# Patient Record
Sex: FEMALE | Race: WHITE | Employment: FULL TIME | URBAN - METROPOLITAN AREA
[De-identification: names, ages, dates, MRNs, and addresses within clinical notes are randomized per-mention and may not be internally consistent; named-entity substitution may affect disease eponyms.]

---

## 2018-08-31 ENCOUNTER — HOSPITAL ENCOUNTER (OUTPATIENT)
Age: 38
Discharge: HOME | End: 2018-08-31

## 2019-09-04 ENCOUNTER — OFFICE VISIT (OUTPATIENT)
Dept: FAMILY MEDICINE CLINIC | Age: 39
End: 2019-09-04
Payer: COMMERCIAL

## 2019-09-04 VITALS
HEIGHT: 68 IN | DIASTOLIC BLOOD PRESSURE: 88 MMHG | HEART RATE: 68 BPM | RESPIRATION RATE: 10 BRPM | TEMPERATURE: 97.8 F | SYSTOLIC BLOOD PRESSURE: 122 MMHG | WEIGHT: 232.4 LBS | OXYGEN SATURATION: 98 % | BODY MASS INDEX: 35.22 KG/M2

## 2019-09-04 DIAGNOSIS — E28.2 PCOS (POLYCYSTIC OVARIAN SYNDROME): ICD-10-CM

## 2019-09-04 DIAGNOSIS — R35.1 NOCTURIA: ICD-10-CM

## 2019-09-04 DIAGNOSIS — R53.83 TIRED: Primary | ICD-10-CM

## 2019-09-04 DIAGNOSIS — R52 ACHES: ICD-10-CM

## 2019-09-04 DIAGNOSIS — G43.809 OTHER MIGRAINE WITHOUT STATUS MIGRAINOSUS, NOT INTRACTABLE: ICD-10-CM

## 2019-09-04 DIAGNOSIS — R63.5 WEIGHT GAIN: ICD-10-CM

## 2019-09-04 DIAGNOSIS — F41.9 ANXIETY: ICD-10-CM

## 2019-09-04 DIAGNOSIS — R45.89 MOODY: ICD-10-CM

## 2019-09-04 PROCEDURE — 99202 OFFICE O/P NEW SF 15 MIN: CPT | Performed by: FAMILY MEDICINE

## 2019-09-04 RX ORDER — NORGESTIMATE AND ETHINYL ESTRADIOL 7DAYSX3 28
KIT ORAL DAILY
Refills: 0 | COMMUNITY
Start: 2019-07-09 | End: 2022-09-19 | Stop reason: ALTCHOICE

## 2019-09-04 SDOH — HEALTH STABILITY: MENTAL HEALTH: HOW OFTEN DO YOU HAVE A DRINK CONTAINING ALCOHOL?: 2-4 TIMES A MONTH

## 2019-09-04 SDOH — HEALTH STABILITY: MENTAL HEALTH: HOW MANY STANDARD DRINKS CONTAINING ALCOHOL DO YOU HAVE ON A TYPICAL DAY?: 3 OR 4

## 2019-09-04 ASSESSMENT — PATIENT HEALTH QUESTIONNAIRE - PHQ9
2. FEELING DOWN, DEPRESSED OR HOPELESS: 0
SUM OF ALL RESPONSES TO PHQ9 QUESTIONS 1 & 2: 0
SUM OF ALL RESPONSES TO PHQ QUESTIONS 1-9: 0
SUM OF ALL RESPONSES TO PHQ QUESTIONS 1-9: 0
1. LITTLE INTEREST OR PLEASURE IN DOING THINGS: 0

## 2019-09-04 ASSESSMENT — ENCOUNTER SYMPTOMS
RHINORRHEA: 1
COUGH: 1
ABDOMINAL DISTENTION: 1

## 2019-09-04 NOTE — PROGRESS NOTES
has a copy of their most recent labs to take home with them as notedbelow;       Imaging Data:   Imaging Data:       Assessment & Plan:       Impression:  1. Tired    2. PCOS (polycystic ovarian syndrome)    3. Weight gain    4. Aches    5. Anxiety    6. Choi    7. Nocturia    8. Other migraine without status migrainosus, not intractable      Assessment and Plan:  After reviewing the patients chief complaints, reviewing their labfindings in great detail (with the patient and those accompanying them) which correlate to their chief complaints, symptoms, and or medical conditions; suggestions were made relating to changes in diet and or supplementswhich may improve the complaints and which will be reflected in their future lab findings; Chief Complaint   Patient presents with   Miguelito manuel protocol- last place for lab in [address] about april 2017    Other     improve health, reproductive issues   ;    Plans for the next visits:  - Abnormal and non-optimal Labs were ordered today to be repeated in the next 120-365 days to assess changes from adjustments in nutrition and or nutrients. - Patient instructed when having ablood draw to ask the  to divide their lab draws into multiple draws over several days if not feeling good at the time of the lab draw or if either prefers to do several smaller blood draws over several days  -Patient instructed to check with insurer before each lab draw and to to to the lab which the insurer directs them for the most cost effective lab draw with the least patient's cost  - Lynn Kemp  will be scheduled subsequentto those results. Sienna Osorio will bring in her drink and food log to her next visit    Chronic Problems Addressed on this Visit:                                   1.  Intensity of Service; Uncontrolled items at this visit;   Chief Complaint   Patient presents with    Established New Doctor     wee protocol- last place for lab in [address] about april if having > 2bowel movement/day  - Centrum Silver or look-a-like for most patients, Centrum plain or look-a-like with iron  - Vitamin D-3 comes as 1,000 IU or 2,000 IU or 5,000 IU gel caps or Liquid drops      Some brands containing or derived from soy oil or corn oil are OK if not allergic to soy  - Elemental Iron 65 mg tabsat bedtime is available over the counter if need more iron     Usually turns bowel movements grey, green or black but not a concern  - Apricot Kernel Oil (by Now) for dry skin sensitive perineal or perianal area skin    Nutrients for ongoing use by Mail order for less expense from www. [employer].com ;  - Triple Strength Fish Oil , 240 Softgels Item [id number]  -B-100 time released balanced B complex Item #196899  - Cinnamon bark 500 mg without Chromium or extract Item #843097  - Calcium carbonate 1000 mg, Magnesium oxide 500 mg, Vit D 3  400 IU Item #439777  - Magnesium oxide 500 mg tabs Item #665798 if less than 2 bowel movements daily  - [employer] Item #819765 for mostpatients, One Daily Item #730258 with iron  - Vit D 3  1,000 Item #357984      2,000 IU Item #558115  5,000 IU Item #589169     Some brands containing orderived from soy oil or corn oil are OK if not allergic to soy    Nutrients for Special Needs by Helen Suggs for less expense from www. [employer].com ;  -Elemental Iron 65 mg tabs Item #603411 if need more iron for low iron on labs    Usually turns bowel movements grey, green or black but not a concern  - Time released Niacin 250 mg Item #962072 for cold intolerance, low libido or impotence  - DHEA 50 mg Item #356548 for improving DHEA levels on labs if having Fatigue    If stools too loose substitute for your Magnesium oxide using;   Magnesium citrate 200 mg tabs(NOT liquid) at [employer]   Magnesium gluconate 550 mgby Eduardo at [employer]. com or amazon. com  Magnesium chloride foot soaks or body sprays  www.[employer]   Magnesium chloride flakes 14.99 Item #: [id number] if

## 2019-10-16 ENCOUNTER — NURSE ONLY (OUTPATIENT)
Dept: LAB | Age: 39
End: 2019-10-16

## 2020-06-11 ENCOUNTER — HOSPITAL ENCOUNTER (OUTPATIENT)
Dept: GENERAL RADIOLOGY | Age: 40
Discharge: HOME | End: 2020-06-11
Payer: COMMERCIAL

## 2020-06-11 ENCOUNTER — HOSPITAL ENCOUNTER (OUTPATIENT)
Age: 40
Discharge: HOME | End: 2020-06-11
Payer: COMMERCIAL

## 2020-06-11 PROCEDURE — 72100 X-RAY EXAM L-S SPINE 2/3 VWS: CPT

## 2020-06-11 PROCEDURE — 77073 BONE LENGTH STUDIES: CPT

## 2020-07-03 ENCOUNTER — HOSPITAL ENCOUNTER (OUTPATIENT)
Dept: MRI IMAGING | Age: 40
Discharge: HOME | End: 2020-07-03
Payer: COMMERCIAL

## 2020-07-03 PROCEDURE — 72148 MRI LUMBAR SPINE W/O DYE: CPT

## 2021-08-27 ENCOUNTER — HOSPITAL ENCOUNTER (OUTPATIENT)
Dept: WOMENS IMAGING | Age: 41
Discharge: HOME | End: 2021-08-27
Payer: COMMERCIAL

## 2021-08-27 DIAGNOSIS — N64.52 BLOODY DISCHARGE FROM LEFT NIPPLE: ICD-10-CM

## 2021-08-27 DIAGNOSIS — N64.52 DISCHARGE FROM NIPPLE: ICD-10-CM

## 2021-08-27 PROCEDURE — G0279 TOMOSYNTHESIS, MAMMO: HCPCS

## 2021-08-27 PROCEDURE — 76642 ULTRASOUND BREAST LIMITED: CPT

## 2022-09-09 ENCOUNTER — HOSPITAL ENCOUNTER (OUTPATIENT)
Dept: MRI IMAGING | Age: 42
Discharge: HOME | End: 2022-09-09
Payer: COMMERCIAL

## 2022-09-09 DIAGNOSIS — Z80.3 FAMILY HISTORY OF MALIGNANT NEOPLASM OF BREAST: ICD-10-CM

## 2022-09-09 DIAGNOSIS — N64.52 NIPPLE DISCHARGE: ICD-10-CM

## 2022-09-09 PROCEDURE — 6360000004 HC RX CONTRAST MEDICATION: Performed by: NURSE PRACTITIONER

## 2022-09-09 PROCEDURE — C8908 MRI W/O FOL W/CONT, BREAST,: HCPCS

## 2022-09-09 PROCEDURE — A9579 GAD-BASE MR CONTRAST NOS,1ML: HCPCS | Performed by: NURSE PRACTITIONER

## 2022-09-09 RX ADMIN — GADOTERIDOL 20 ML: 279.3 INJECTION, SOLUTION INTRAVENOUS at 08:14

## 2022-09-16 ENCOUNTER — TELEPHONE (OUTPATIENT)
Dept: SURGERY | Age: 42
End: 2022-09-16

## 2022-09-16 ENCOUNTER — HOSPITAL ENCOUNTER (OUTPATIENT)
Dept: WOMENS IMAGING | Age: 42
Discharge: HOME | End: 2022-09-16
Payer: COMMERCIAL

## 2022-09-16 DIAGNOSIS — R92.8 ABNORMAL MRI, BREAST: ICD-10-CM

## 2022-09-16 DIAGNOSIS — N64.52 NIPPLE DISCHARGE: ICD-10-CM

## 2022-09-16 DIAGNOSIS — N64.52 BLOODY DISCHARGE FROM LEFT NIPPLE: ICD-10-CM

## 2022-09-16 PROCEDURE — G0279 TOMOSYNTHESIS, MAMMO: HCPCS

## 2022-09-16 PROCEDURE — 76642 ULTRASOUND BREAST LIMITED: CPT

## 2022-09-16 NOTE — TELEPHONE ENCOUNTER
Left a message for patient. Patient needs scheduled for an appointment with Dr. Gerardo Ugarte for consultation of left bloody nipple discharge per [facility].

## 2022-09-19 ENCOUNTER — OFFICE VISIT (OUTPATIENT)
Dept: SURGERY | Age: 42
End: 2022-09-19
Payer: COMMERCIAL

## 2022-09-19 VITALS
HEART RATE: 62 BPM | DIASTOLIC BLOOD PRESSURE: 62 MMHG | WEIGHT: 241.7 LBS | SYSTOLIC BLOOD PRESSURE: 120 MMHG | BODY MASS INDEX: 35.8 KG/M2 | HEIGHT: 69 IN | TEMPERATURE: 97.6 F | RESPIRATION RATE: 18 BRPM | OXYGEN SATURATION: 98 %

## 2022-09-19 DIAGNOSIS — N64.52 BLOODY DISCHARGE FROM LEFT NIPPLE: Primary | ICD-10-CM

## 2022-09-19 PROCEDURE — 99203 OFFICE O/P NEW LOW 30 MIN: CPT | Performed by: SURGERY

## 2022-09-19 RX ORDER — DROSPIRENONE 4 MG/1
TABLET, FILM COATED ORAL
COMMUNITY
Start: 2021-12-01

## 2022-09-19 NOTE — PROGRESS NOTES
Naida Mchugh MD   General Surgery  New Patient Evaluation in Office  Pt Name: Sangeetha Moreno  Date of Birth 1980   Today's Date: 9/19/2022  Medical Record Number: [id number]  Referring Provider: Dr. Jacque Silva  Primary Care Provider: Kimberli Grant MD  Chief Complaint:  Chief Complaint   Patient presents with    Surgical Consult     New patient-referred by [facility] breast bloody nipple discharge       ASSESSMENT      1. Bloody discharge from left nipple    2. MRI detected lesion left nipple  3. Premenopausal     PLANS      Patient needs tissue diagnosis with intermittent bloody nipple discharge and lesion seen on MRI at the nipple. Clinical exam is benign. No active bleeding today. Schedule patient for left breast biopsy with subareolar mass excision  MAC anesthesia  Preoperative testing per anesthesia guidelines        SUBJECTIVE     Clarice Eisenberg is a 38y.o. year old female who is presenting today in the office for evaluation of intermittent bloody nipple discharge which she has had for about 1 year. She states she will occasionally see some blood staining on her bra. She has no skin lesions. She had recent bilateral mammography and left breast ultrasound as well as an MRI. He had breast imaging last year as well. Mammography revealed a heterogeneous hypoechoic area demonstrated at the left nipple likely involving the dermal layer with indistinct margins likely correlating to area on MRI. Subjacent to the lesion there is a possible intraductal component. Tissue diagnosis recommended. On MRI there is a focus of an enhancement apical left nipple. Clarice Eisenberg has had no prior breast biopsies. She took oral contraceptive medications for about 1 year. She now takes an oral contraceptive medication which is progesterone only. She has a history of polycystic ovary syndrome and her menstrual periods are irregular at times. Menarche age 17. G0, P0. She has 3 stepdaughters.   Paternal grandmother history of Negative for pain, redness and visual disturbance. Respiratory:  Negative for cough, shortness of breath and wheezing. Cardiovascular:  Negative for chest pain and palpitations. Gastrointestinal:  Negative for abdominal pain, blood in stool, nausea and vomiting. Endocrine: Negative for cold intolerance, heat intolerance and polydipsia. Genitourinary:  Negative for dysuria, flank pain and hematuria. Musculoskeletal:  Positive for back pain. Negative for gait problem, joint swelling and myalgias. Skin:  Negative for color change and rash. Allergic/Immunologic: Negative for immunocompromised state. Neurological:  Negative for dizziness, tremors, seizures, speech difficulty, light-headedness and headaches. Hematological:  Negative for adenopathy. Does not bruise/bleed easily. Psychiatric/Behavioral:  Negative for agitation, behavioral problems and confusion. OBJECTIVE     /62 (Site: Left Upper Arm, Position: Sitting, Cuff Size: Medium Adult)   Pulse 62   Temp 97.6 °F (36.4 °C) (Temporal)   Resp 18   Ht 5' 9\" (1.753 m)   Wt 241 lb 11.2 oz (109.6 kg)   SpO2 98%   BMI 35.69 kg/m²      Physical Exam  Vitals reviewed. Constitutional:       Appearance: Normal appearance. She is well-developed. She is not ill-appearing. HENT:      Head: Normocephalic and atraumatic. Nose: Nose normal. No congestion. Eyes:      General: No scleral icterus. Extraocular Movements: Extraocular movements intact. Neck:      Vascular: No JVD. Trachea: No tracheal deviation. Cardiovascular:      Rate and Rhythm: Normal rate and regular rhythm. Heart sounds: Normal heart sounds. No murmur heard. Pulmonary:      Effort: Pulmonary effort is normal. No respiratory distress. Breath sounds: Normal breath sounds. No wheezing. Chest:      Chest wall: No mass, swelling or tenderness.    Breasts:     Right: No swelling, bleeding, inverted nipple, mass, nipple discharge, skin change, tenderness, axillary adenopathy or supraclavicular adenopathy. Left: No swelling, bleeding, inverted nipple, mass, nipple discharge, skin change, tenderness, axillary adenopathy or supraclavicular adenopathy. Comments: No obvious nipple abnormality or mass to palpation or inspection on the left  Abdominal:      General: There is no distension. Palpations: There is no mass. Tenderness: There is no abdominal tenderness. Musculoskeletal:         General: No deformity. Cervical back: No tenderness. Right lower leg: No edema. Left lower leg: No edema. Lymphadenopathy:      Cervical: No cervical adenopathy. Right cervical: No superficial, deep or posterior cervical adenopathy. Left cervical: No superficial, deep or posterior cervical adenopathy. Upper Body:      Right upper body: No supraclavicular, axillary or pectoral adenopathy. Left upper body: No supraclavicular, axillary or pectoral adenopathy. Skin:     General: Skin is warm and dry. Coloration: Skin is not jaundiced or pale. Findings: No rash. Neurological:      General: No focal deficit present. Mental Status: She is alert and oriented to person, place, and time. Cranial Nerves: No cranial nerve deficit. Psychiatric:         Mood and Affect: Mood normal.         Behavior: Behavior normal.       No results found for: WBC, HGB, HCT, PLT, CHOL, TRIG, HDL, LDLDIRECT, ALT, AST, NA, K, CL, CREATININE, BUN, CO2, TSH, PSA, INR, GLUF, LABA1C, LABMICR           LOCATION: [place]       PROCEDURE: [facility] LIBBY DIGITAL DIAGNOSTIC BILATERAL, US BREAST LIMITED LEFT       CLINICAL INFORMATION: Nipple discharge . Tomosynthesis. PATIENT MEDICAL HISTORY: No relevant medical history has been documented for this patient. FAMILY HISTORY: Family medical history includes breast cancer in paternal grandmother.        RISK VALUES: Tyrer-Cuzick 10yr.: 2.7%, Tyrer-Cuzick life: 22.4%       COMPARISON: 9/9/2022, 8/27/2021       [facility] LIBBY DIGITAL DIAGNOSTIC BILATERAL:       TECHNIQUE: Bilateral CC and MLO views were obtained each breast. Tomosynthesis was used. CAD was used. BREAST COMPOSITION: The tissue of the breast(s) is heterogeneously dense. This may lower the sensitivity of mammography. FINDINGS:        There is no mammographic correlate for for the patient's left bloody nipple discharge. Targeted left breast ultrasound is reported below. No significant masses, calcifications, or other findings are seen in the breast(s). There has been no significant interval change. US BREAST LIMITED LEFT:       TECHNIQUE: Targeted ultrasound of the left breast was performed. Grayscale images and color images of the real-time examination were reviewed. The axilla was also imaged. FINDINGS:        There is a heterogeneous hypoechoic area demonstrated at the left nipple which involves the dermal layer. This demonstrates indistinct margins. This likely correlates with area of increased enhancement seen on prior breast MRI dated 9/9/2022. This    measures approximately 1.6 x 1.7 x 1 cm in dimension. Of note, subjacent to this lesion is a possible intraductal component emanating from this hypoechoic area. Given the superficial and suspected dermal involvement, recommend surgical consultation for    possible punch or excisional biopsy. There are no sonographic abnormalities in the axilla. Normal lymph nodes are seen. Impression   1. There is a heterogeneous hypoechoic area demonstrated at the left nipple which involves the dermal layer. This demonstrates indistinct margins. This likely correlates with area of increased enhancement seen on prior breast MRI dated 9/9/2022. This    measures approximately 1.6 x 1.7 x 1 cm in dimension. Of note, subjacent to this lesion is a possible intraductal component emanating from this hypoechoic area.  Given the superficial and suspected dermal involvement, recommend surgical consultation for    possible punch or excisional biopsy. BI-RADS CATEGORY 4B - Suspicious abnormality - Intermediate suspicion for malignancy. Management: Tissue diagnosis. Biopsy should be performed in the absence of clinical contraindication. Given the superficial and suspected dermal involvement, recommend surgical consultation for possible punch or excisional biopsy. **This report has been created using voice recognition software. It may contain minor errors which are inherent in voice recognition technology. **       Final report electronically signed by Dr. Fran Jeffers on 9/18/2022 11:23 PM         ADDENDUM #1 *       Armaan Wise calculations report this patient's lifetime risk for    developing breast cancer at 22.4 %. Based on this assessment tool, the    patient's calculated lifetime risk is at or above 20%. Per the American    Cancer Society guidelines, she may be a    candidate for screening breast MRI. Final report electronically signed by Dr. Fran Jeffers on 9/19/2022 12:19    AM       *ORIGINAL REPORT *   EXAM: MRI BREAST BILATERAL W WO CONTRAST        INDICATION: Bloody nipple discharge, left side. HISTORY: 46 years, Female, Family history of malignant neoplasm of breast,    Nipple discharge       COMPARISON: 8/27/2021       TECHNIQUE: Axial and coronal T2 STIR, sagittal T1, axial T1 fat saturated    pre and postcontrast dynamic imaging was performed of both breasts. Intravenous ProHance gadolinium was given. Images were reviewed on a    separate dedicated 3-D workstation and time    intensity curves were analyzed. FINDINGS:       Amount of Fibroglandular Tissue: The breast parenchyma is heterogeneously    dense. Background Parenchymal Enhancement:  Mild. Right breast: There are no areas of suspicious enhancement. There is no    suspicious morphology. No dominant lesions are present.        There is a 5 mm focus of enhancement within the right breast on axial    image 59 series 9. This demonstrates washout kinetics enhancement    characteristics. This correlates with a benign-appearing intramammary    lymph node within the outer right breast seen on    prior mammogram dated 8/27/2021. Left breast:        There is a focus of enhancement demonstrated apical left nipple seen on    axial image 39 series 9. This measures 9 x 4 mm. This demonstrates    heterogeneous enhancement kinetics including washout kinetics. Recommend    second look ultrasound of the left    periareolar/retroareolar region. There are no other areas of suspicious enhancement. There is no other    suspicious morphology. There are no abnormalities in the axilla, mediastinum or visualized bones. MRI BREAST BILATERAL W WO CONTRAST (Order [id number])  Narrative   EXAM: MRI BREAST BILATERAL W WO CONTRAST        INDICATION: Bloody nipple discharge, left side. HISTORY: 46 years, Female, Family history of malignant neoplasm of breast,    Nipple discharge       COMPARISON: 8/27/2021       TECHNIQUE: Axial and coronal T2 STIR, sagittal T1, axial T1 fat saturated    pre and postcontrast dynamic imaging was performed of both breasts. Intravenous ProHance gadolinium was given. Images were reviewed on a    separate dedicated 3-D workstation and time    intensity curves were analyzed. FINDINGS:       Amount of Fibroglandular Tissue: The breast parenchyma is heterogeneously    dense. Background Parenchymal Enhancement:  Mild. Right breast: There are no areas of suspicious enhancement. There is no    suspicious morphology. No dominant lesions are present. There is a 5 mm focus of enhancement within the right breast on axial    image 59 series 9. This demonstrates washout kinetics enhancement    characteristics.  This correlates with a benign-appearing intramammary    lymph node within the outer right breast seen on

## 2022-09-20 ASSESSMENT — ENCOUNTER SYMPTOMS
SORE THROAT: 0
ABDOMINAL PAIN: 0
EYE PAIN: 0
COLOR CHANGE: 0
SHORTNESS OF BREATH: 0
WHEEZING: 0
TROUBLE SWALLOWING: 0
NAUSEA: 0
BLOOD IN STOOL: 0
EYE REDNESS: 0
VOMITING: 0
COUGH: 0
BACK PAIN: 1

## 2022-09-23 ENCOUNTER — NURSE ONLY (OUTPATIENT)
Dept: LAB | Age: 42
End: 2022-09-23

## 2022-09-23 DIAGNOSIS — N64.52 BLOODY DISCHARGE FROM LEFT NIPPLE: ICD-10-CM

## 2022-09-23 LAB
HCT VFR BLD CALC: 43.1 % (ref 37–47)
HEMOGLOBIN: 14 GM/DL (ref 12–16)

## 2022-09-26 ENCOUNTER — ANESTHESIA (OUTPATIENT)
Dept: OPERATING ROOM | Age: 42
End: 2022-09-26
Payer: COMMERCIAL

## 2022-09-26 ENCOUNTER — HOSPITAL ENCOUNTER (OUTPATIENT)
Age: 42
Setting detail: OUTPATIENT SURGERY
Discharge: HOME | End: 2022-09-26
Attending: SURGERY | Admitting: SURGERY
Payer: COMMERCIAL

## 2022-09-26 ENCOUNTER — ANESTHESIA EVENT (OUTPATIENT)
Dept: OPERATING ROOM | Age: 42
End: 2022-09-26
Payer: COMMERCIAL

## 2022-09-26 VITALS
OXYGEN SATURATION: 97 % | WEIGHT: 241.6 LBS | RESPIRATION RATE: 18 BRPM | TEMPERATURE: 97.1 F | HEART RATE: 53 BPM | BODY MASS INDEX: 35.78 KG/M2 | DIASTOLIC BLOOD PRESSURE: 56 MMHG | SYSTOLIC BLOOD PRESSURE: 105 MMHG | HEIGHT: 69 IN

## 2022-09-26 DIAGNOSIS — N64.52 NIPPLE DISCHARGE, BLOODY: ICD-10-CM

## 2022-09-26 LAB — PREGNANCY, URINE: NEGATIVE

## 2022-09-26 PROCEDURE — 3600000013 HC SURGERY LEVEL 3 ADDTL 15MIN: Performed by: SURGERY

## 2022-09-26 PROCEDURE — 6360000002 HC RX W HCPCS

## 2022-09-26 PROCEDURE — 7100000011 HC PHASE II RECOVERY - ADDTL 15 MIN: Performed by: SURGERY

## 2022-09-26 PROCEDURE — 7100000010 HC PHASE II RECOVERY - FIRST 15 MIN: Performed by: SURGERY

## 2022-09-26 PROCEDURE — 2709999900 HC NON-CHARGEABLE SUPPLY: Performed by: SURGERY

## 2022-09-26 PROCEDURE — 3700000001 HC ADD 15 MINUTES (ANESTHESIA): Performed by: SURGERY

## 2022-09-26 PROCEDURE — 88307 TISSUE EXAM BY PATHOLOGIST: CPT

## 2022-09-26 PROCEDURE — 19120 REMOVAL OF BREAST LESION: CPT | Performed by: SURGERY

## 2022-09-26 PROCEDURE — 3600000003 HC SURGERY LEVEL 3 BASE: Performed by: SURGERY

## 2022-09-26 PROCEDURE — 2500000003 HC RX 250 WO HCPCS: Performed by: SURGERY

## 2022-09-26 PROCEDURE — 3700000000 HC ANESTHESIA ATTENDED CARE: Performed by: SURGERY

## 2022-09-26 PROCEDURE — 2580000003 HC RX 258: Performed by: SURGERY

## 2022-09-26 PROCEDURE — 2500000003 HC RX 250 WO HCPCS

## 2022-09-26 PROCEDURE — 6360000002 HC RX W HCPCS: Performed by: SURGERY

## 2022-09-26 PROCEDURE — 81025 URINE PREGNANCY TEST: CPT

## 2022-09-26 RX ORDER — MORPHINE SULFATE 2 MG/ML
2 INJECTION, SOLUTION INTRAMUSCULAR; INTRAVENOUS
Status: DISCONTINUED | OUTPATIENT
Start: 2022-09-26 | End: 2022-09-26 | Stop reason: HOSPADM

## 2022-09-26 RX ORDER — PROPOFOL 10 MG/ML
INJECTION, EMULSION INTRAVENOUS CONTINUOUS PRN
Status: DISCONTINUED | OUTPATIENT
Start: 2022-09-26 | End: 2022-09-26 | Stop reason: SDUPTHER

## 2022-09-26 RX ORDER — ACETAMINOPHEN 325 MG/1
650 TABLET ORAL EVERY 4 HOURS PRN
Status: DISCONTINUED | OUTPATIENT
Start: 2022-09-26 | End: 2022-09-26 | Stop reason: HOSPADM

## 2022-09-26 RX ORDER — OXYCODONE HYDROCHLORIDE 5 MG/1
5 TABLET ORAL EVERY 4 HOURS PRN
Status: DISCONTINUED | OUTPATIENT
Start: 2022-09-26 | End: 2022-09-26 | Stop reason: HOSPADM

## 2022-09-26 RX ORDER — ONDANSETRON 2 MG/ML
4 INJECTION INTRAMUSCULAR; INTRAVENOUS EVERY 6 HOURS PRN
Status: DISCONTINUED | OUTPATIENT
Start: 2022-09-26 | End: 2022-09-26 | Stop reason: HOSPADM

## 2022-09-26 RX ORDER — IBUPROFEN 600 MG/1
600 TABLET ORAL EVERY 8 HOURS
Status: DISCONTINUED | OUTPATIENT
Start: 2022-09-26 | End: 2022-09-26 | Stop reason: HOSPADM

## 2022-09-26 RX ORDER — LIDOCAINE HYDROCHLORIDE 20 MG/ML
INJECTION, SOLUTION EPIDURAL; INFILTRATION; INTRACAUDAL; PERINEURAL PRN
Status: DISCONTINUED | OUTPATIENT
Start: 2022-09-26 | End: 2022-09-26 | Stop reason: SDUPTHER

## 2022-09-26 RX ORDER — SODIUM CHLORIDE 0.9 % (FLUSH) 0.9 %
5-40 SYRINGE (ML) INJECTION PRN
Status: DISCONTINUED | OUTPATIENT
Start: 2022-09-26 | End: 2022-09-26 | Stop reason: HOSPADM

## 2022-09-26 RX ORDER — MORPHINE SULFATE 2 MG/ML
4 INJECTION, SOLUTION INTRAMUSCULAR; INTRAVENOUS
Status: DISCONTINUED | OUTPATIENT
Start: 2022-09-26 | End: 2022-09-26 | Stop reason: HOSPADM

## 2022-09-26 RX ORDER — SODIUM CHLORIDE 0.9 % (FLUSH) 0.9 %
5-40 SYRINGE (ML) INJECTION EVERY 12 HOURS SCHEDULED
Status: DISCONTINUED | OUTPATIENT
Start: 2022-09-26 | End: 2022-09-26 | Stop reason: HOSPADM

## 2022-09-26 RX ORDER — PROPOFOL 10 MG/ML
INJECTION, EMULSION INTRAVENOUS PRN
Status: DISCONTINUED | OUTPATIENT
Start: 2022-09-26 | End: 2022-09-26 | Stop reason: SDUPTHER

## 2022-09-26 RX ORDER — OXYCODONE HYDROCHLORIDE AND ACETAMINOPHEN 5; 325 MG/1; MG/1
1 TABLET ORAL EVERY 6 HOURS PRN
Qty: 12 TABLET | Refills: 0 | Status: SHIPPED | OUTPATIENT
Start: 2022-09-26 | End: 2022-09-29

## 2022-09-26 RX ORDER — SODIUM CHLORIDE 9 MG/ML
INJECTION, SOLUTION INTRAVENOUS CONTINUOUS
Status: DISCONTINUED | OUTPATIENT
Start: 2022-09-26 | End: 2022-09-26 | Stop reason: HOSPADM

## 2022-09-26 RX ORDER — ENOXAPARIN SODIUM 100 MG/ML
30 INJECTION SUBCUTANEOUS 2 TIMES DAILY
Status: DISCONTINUED | OUTPATIENT
Start: 2022-09-26 | End: 2022-09-26 | Stop reason: HOSPADM

## 2022-09-26 RX ORDER — OXYCODONE HYDROCHLORIDE 5 MG/1
10 TABLET ORAL EVERY 4 HOURS PRN
Status: DISCONTINUED | OUTPATIENT
Start: 2022-09-26 | End: 2022-09-26 | Stop reason: HOSPADM

## 2022-09-26 RX ORDER — SODIUM CHLORIDE 9 MG/ML
INJECTION, SOLUTION INTRAVENOUS PRN
Status: DISCONTINUED | OUTPATIENT
Start: 2022-09-26 | End: 2022-09-26 | Stop reason: HOSPADM

## 2022-09-26 RX ORDER — MIDAZOLAM HYDROCHLORIDE 1 MG/ML
INJECTION INTRAMUSCULAR; INTRAVENOUS PRN
Status: DISCONTINUED | OUTPATIENT
Start: 2022-09-26 | End: 2022-09-26 | Stop reason: SDUPTHER

## 2022-09-26 RX ORDER — ONDANSETRON 4 MG/1
4 TABLET, ORALLY DISINTEGRATING ORAL EVERY 8 HOURS PRN
Status: DISCONTINUED | OUTPATIENT
Start: 2022-09-26 | End: 2022-09-26 | Stop reason: HOSPADM

## 2022-09-26 RX ORDER — FENTANYL CITRATE 50 UG/ML
INJECTION, SOLUTION INTRAMUSCULAR; INTRAVENOUS PRN
Status: DISCONTINUED | OUTPATIENT
Start: 2022-09-26 | End: 2022-09-26 | Stop reason: SDUPTHER

## 2022-09-26 RX ORDER — ONDANSETRON 2 MG/ML
INJECTION INTRAMUSCULAR; INTRAVENOUS PRN
Status: DISCONTINUED | OUTPATIENT
Start: 2022-09-26 | End: 2022-09-26 | Stop reason: SDUPTHER

## 2022-09-26 RX ADMIN — MIDAZOLAM 2 MG: 1 INJECTION INTRAMUSCULAR; INTRAVENOUS at 09:45

## 2022-09-26 RX ADMIN — PROPOFOL 150 MCG/KG/MIN: 10 INJECTION, EMULSION INTRAVENOUS at 09:50

## 2022-09-26 RX ADMIN — ONDANSETRON 4 MG: 2 INJECTION INTRAMUSCULAR; INTRAVENOUS at 10:17

## 2022-09-26 RX ADMIN — LIDOCAINE HYDROCHLORIDE 100 MG: 20 INJECTION, SOLUTION EPIDURAL; INFILTRATION; INTRACAUDAL; PERINEURAL at 09:50

## 2022-09-26 RX ADMIN — FENTANYL CITRATE 50 MCG: 50 INJECTION, SOLUTION INTRAMUSCULAR; INTRAVENOUS at 09:50

## 2022-09-26 RX ADMIN — SODIUM CHLORIDE: 9 INJECTION, SOLUTION INTRAVENOUS at 09:30

## 2022-09-26 RX ADMIN — CEFAZOLIN 2000 MG: 10 INJECTION, POWDER, FOR SOLUTION INTRAVENOUS at 09:51

## 2022-09-26 RX ADMIN — PROPOFOL 30 MG: 10 INJECTION, EMULSION INTRAVENOUS at 09:50

## 2022-09-26 RX ADMIN — FENTANYL CITRATE 50 MCG: 50 INJECTION, SOLUTION INTRAMUSCULAR; INTRAVENOUS at 10:00

## 2022-09-26 ASSESSMENT — PAIN SCALES - GENERAL
PAINLEVEL_OUTOF10: 1
PAINLEVEL_OUTOF10: 2
PAINLEVEL_OUTOF10: 2
PAINLEVEL_OUTOF10: 0

## 2022-09-26 ASSESSMENT — PAIN DESCRIPTION - ORIENTATION: ORIENTATION: LOWER

## 2022-09-26 ASSESSMENT — PAIN DESCRIPTION - LOCATION: LOCATION: BACK

## 2022-09-26 NOTE — ANESTHESIA POSTPROCEDURE EVALUATION
Department of Anesthesiology  Postprocedure Note    Patient: Sangeetha Moreno  MRN: [id number]  YOB: 1980  Date of evaluation: 9/26/2022      Procedure Summary     Date: 09/26/22 Room / Location: [address] [facility]    Anesthesia Start: 3863 Anesthesia Stop: 1026    Procedure: LEFT BREAST BX, SUBAREOLAR EXCISION (Left: Breast) Diagnosis:       Nipple discharge, bloody      (Nipple discharge, bloody [N64.52])    Surgeons: Jeffery Estrella MD Responsible Provider: Viraj Victor MD    Anesthesia Type: MAC ASA Status: 2          Anesthesia Type: No value filed.     Kendy Phase I: Kendy Score: 10    Kendy Phase II: Kendy Score: 10      Anesthesia Post Evaluation    Patient location during evaluation: PACU  Patient participation: complete - patient participated  Level of consciousness: awake and alert  Airway patency: patent  Nausea & Vomiting: no nausea  Complications: no  Cardiovascular status: blood pressure returned to baseline and hemodynamically stable  Respiratory status: acceptable and spontaneous ventilation  Hydration status: euvolemic

## 2022-09-26 NOTE — H&P
[address] [address]  History and Physical Update    Pt Name: Sushil Pereira  MRN: [id number]  YOB: 1980  Date of evaluation: 9/26/2022    [x] I have examined the patient and reviewed the H&P/Consult and there are no changes to the patient or plans. [] I have examined the patient and reviewed the H&P/Consult and have noted the following changes:        Gallo Campos MD MD  Electronically signed 9/26/2022 at 8:55 Ivette Roman MD   General Surgery  New Patient Evaluation in Office  Pt Name: Sushil Pereira  Date of Birth 1980   Today's Date: 9/19/2022  Medical Record Number: [id number]  Referring Provider: Dr. Jacky Forde  Primary Care Provider: Arsalan Sales MD  Chief Complaint:       Chief Complaint   Patient presents with    Surgical Consult       New patient-referred by [facility] breast bloody nipple discharge         ASSESSMENT   1. Bloody discharge from left nipple    2. MRI detected lesion left nipple  3. Premenopausal  PLANS   Patient needs tissue diagnosis with intermittent bloody nipple discharge and lesion seen on MRI at the nipple. Clinical exam is benign. No active bleeding today.   Schedule patient for left breast biopsy with subareolar mass excision  MAC anesthesia  Preoperative testing per anesthesia guidelines         SUBJECTIVE      Alexsander Novoa is a 38y.o. year old female who is presenting today in the office for evaluation of intermittent bloody nipple discharge which she has had for about 1 year. She states she will occasionally see some blood staining on her bra. She has no skin lesions. She had recent bilateral mammography and left breast ultrasound as well as an MRI. He had breast imaging last year as well. Mammography revealed a heterogeneous hypoechoic area demonstrated at the left nipple likely involving the dermal layer with indistinct margins likely correlating to area on MRI. Subjacent to the lesion there is a possible intraductal component. Tissue diagnosis recommended. On MRI there is a focus of an enhancement apical left nipple. Keya Martinez has had no prior breast biopsies. She took oral contraceptive medications for about 1 year. She now takes an oral contraceptive medication which is progesterone only. She has a history of polycystic ovary syndrome and her menstrual periods are irregular at times. Menarche age 17. G0, P0. She has 3 stepdaughters. Paternal grandmother history of breast cancer. Father history of CLL Sister history of  ALL. Past Medical History  Past Medical History        Past Medical History:   Diagnosis Date    Lumbar herniated disc      Nipple discharge       left    PCOS (polycystic ovarian syndrome)            Past Surgical History  Past Surgical History         Past Surgical History:   Procedure Laterality Date    TONSILLECTOMY AND ADENOIDECTOMY         age 27          Medications  Current Facility-Administered Medications          Current Outpatient Medications   Medication Sig Dispense Refill    Drospirenone (SLYND) 4 MG TABS            No current facility-administered medications for this visit.          Allergies   No Known Allergies    Family History  Family History         Family History   Problem Relation Age of Onset    No Known Problems Mother      Cancer Father           CML    Cancer Sister           ALL    No Known Problems Maternal Grandmother      No Known Problems Maternal Grandfather      Breast Cancer Paternal Grandmother           >50    No Known Problems Paternal Grandfather            SocialHistory  Social History               Socioeconomic History    Marital status:        Spouse name: Not on file    Number of children: Not on file    Years of education: Not on file    Highest education level: Not on file   Occupational History    Not on file   Tobacco Use    Smoking status: Never    Smokeless tobacco: Never   Substance and Sexual Activity    Alcohol use: Yes    Drug use: Never    Sexual activity: Not on file   Other Topics Concern    Not on file   Social History Narrative    Not on file      Social Determinants of Health      Financial Resource Strain: Not on file   Food Insecurity: Not on file   Transportation Needs: Not on file   Physical Activity: Not on file   Stress: Not on file   Social Connections: Not on file   Intimate Partner Violence: Not on file   Housing Stability: Not on file              Review of Systems  Review of Systems   Constitutional:  Negative for activity change, chills, fatigue, fever and unexpected weight change. HENT:  Negative for congestion, sore throat and trouble swallowing. Eyes:  Negative for pain, redness and visual disturbance. Respiratory:  Negative for cough, shortness of breath and wheezing. Cardiovascular:  Negative for chest pain and palpitations. Gastrointestinal:  Negative for abdominal pain, blood in stool, nausea and vomiting. Endocrine: Negative for cold intolerance, heat intolerance and polydipsia. Genitourinary:  Negative for dysuria, flank pain and hematuria. Musculoskeletal:  Positive for back pain. Negative for gait problem, joint swelling and myalgias. Skin:  Negative for color change and rash. Allergic/Immunologic: Negative for immunocompromised state. Neurological:  Negative for dizziness, tremors, seizures, speech difficulty, light-headedness and headaches. Hematological:  Negative for adenopathy. Does not bruise/bleed easily. Psychiatric/Behavioral:  Negative for agitation, behavioral problems and confusion. OBJECTIVE      /62 (Site: Left Upper Arm, Position: Sitting, Cuff Size: Medium Adult)   Pulse 62   Temp 97.6 °F (36.4 °C) (Temporal)   Resp 18   Ht 5' 9\" (1.753 m)   Wt 241 lb 11.2 oz (109.6 kg)   SpO2 98%   BMI 35.69 kg/m²       Physical Exam  Vitals reviewed. Constitutional:       Appearance: Normal appearance. She is well-developed. She is not ill-appearing. HENT:      Head: Normocephalic and atraumatic. Nose: Nose normal. No congestion. Eyes:      General: No scleral icterus. Extraocular Movements: Extraocular movements intact. Neck:      Vascular: No JVD. Trachea: No tracheal deviation. Cardiovascular:      Rate and Rhythm: Normal rate and regular rhythm. Heart sounds: Normal heart sounds. No murmur heard. Pulmonary:      Effort: Pulmonary effort is normal. No respiratory distress. Breath sounds: Normal breath sounds. No wheezing. Chest:      Chest wall: No mass, swelling or tenderness. Breasts:     Right: No swelling, bleeding, inverted nipple, mass, nipple discharge, skin change, tenderness, axillary adenopathy or supraclavicular adenopathy. Left: No swelling, bleeding, inverted nipple, mass, nipple discharge, skin change, tenderness, axillary adenopathy or supraclavicular adenopathy. Comments: No obvious nipple abnormality or mass to palpation or inspection on the left  Abdominal:      General: There is no distension. Palpations: There is no mass. Tenderness: There is no abdominal tenderness. Musculoskeletal:         General: No deformity. Cervical back: No tenderness. Right lower leg: No edema. Left lower leg: No edema. Lymphadenopathy:      Cervical: No cervical adenopathy. Right cervical: No superficial, deep or posterior cervical adenopathy. Left cervical: No superficial, deep or posterior cervical adenopathy. Upper Body:      Right upper body: No supraclavicular, axillary or pectoral adenopathy. Left upper body: No supraclavicular, axillary or pectoral adenopathy. Skin:     General: Skin is warm and dry. Coloration: Skin is not jaundiced or pale. Findings: No rash. Neurological:      General: No focal deficit present. Mental Status: She is alert and oriented to person, place, and time. Cranial Nerves: No cranial nerve deficit. Psychiatric:         Mood and Affect: Mood normal.         Behavior: Behavior normal.         No results found for: WBC, HGB, HCT, PLT, CHOL, TRIG, HDL, LDLDIRECT, ALT, AST, NA, K, CL, CREATININE, BUN, CO2, TSH, PSA, INR, GLUF, LABA1C, LABMICR               LOCATION: [place]       PROCEDURE: [facility] LIBBY DIGITAL DIAGNOSTIC BILATERAL, US BREAST LIMITED LEFT       CLINICAL INFORMATION: Nipple discharge . Tomosynthesis. PATIENT MEDICAL HISTORY: No relevant medical history has been documented for this patient. FAMILY HISTORY: Family medical history includes breast cancer in paternal grandmother. RISK VALUES: Tyrer-Cuzick 10yr.: 2.7%, Tyrer-Cuzick life: 22.4%       COMPARISON: 9/9/2022, 8/27/2021       [facility] LIBBY DIGITAL DIAGNOSTIC BILATERAL:       TECHNIQUE: Bilateral CC and MLO views were obtained each breast. Tomosynthesis was used. CAD was used. BREAST COMPOSITION: The tissue of the breast(s) is heterogeneously dense. This may lower the sensitivity of mammography. FINDINGS:        There is no mammographic correlate for for the patient's left bloody nipple discharge. Targeted left breast ultrasound is reported below. No significant masses, calcifications, or other findings are seen in the breast(s). There has been no significant interval change. US BREAST LIMITED LEFT:       TECHNIQUE: Targeted ultrasound of the left breast was performed. Grayscale images and color images of the real-time examination were reviewed.  The axilla was also imaged. FINDINGS:        There is a heterogeneous hypoechoic area demonstrated at the left nipple which involves the dermal layer. This demonstrates indistinct margins. This likely correlates with area of increased enhancement seen on prior breast MRI dated 9/9/2022. This    measures approximately 1.6 x 1.7 x 1 cm in dimension. Of note, subjacent to this lesion is a possible intraductal component emanating from this hypoechoic area. Given the superficial and suspected dermal involvement, recommend surgical consultation for    possible punch or excisional biopsy. There are no sonographic abnormalities in the axilla. Normal lymph nodes are seen. Impression   1. There is a heterogeneous hypoechoic area demonstrated at the left nipple which involves the dermal layer. This demonstrates indistinct margins. This likely correlates with area of increased enhancement seen on prior breast MRI dated 9/9/2022. This    measures approximately 1.6 x 1.7 x 1 cm in dimension. Of note, subjacent to this lesion is a possible intraductal component emanating from this hypoechoic area. Given the superficial and suspected dermal involvement, recommend surgical consultation for    possible punch or excisional biopsy. BI-RADS CATEGORY 4B - Suspicious abnormality - Intermediate suspicion for malignancy. Management: Tissue diagnosis. Biopsy should be performed in the absence of clinical contraindication. Given the superficial and suspected dermal involvement, recommend surgical consultation for possible punch or excisional biopsy. **This report has been created using voice recognition software. It may contain minor errors which are inherent in voice recognition technology. **       Final report electronically signed by Dr. Shane Gloria on 9/18/2022 11:23 PM            ADDENDUM #1 *       Nicol Skaggs calculations report this patient's lifetime risk for    developing breast cancer at 22.4 %. Based on this assessment tool, the    patient's calculated lifetime risk is at or above 20%. Per the American    Cancer Society guidelines, she may be a    candidate for screening breast MRI. Final report electronically signed by Dr. Temitope An on 9/19/2022 12:19    AM       *ORIGINAL REPORT *   EXAM: MRI BREAST BILATERAL W WO CONTRAST        INDICATION: Bloody nipple discharge, left side. HISTORY: 46 years, Female, Family history of malignant neoplasm of breast,    Nipple discharge       COMPARISON: 8/27/2021       TECHNIQUE: Axial and coronal T2 STIR, sagittal T1, axial T1 fat saturated    pre and postcontrast dynamic imaging was performed of both breasts. Intravenous ProHance gadolinium was given. Images were reviewed on a    separate dedicated 3-D workstation and time    intensity curves were analyzed. FINDINGS:       Amount of Fibroglandular Tissue: The breast parenchyma is heterogeneously    dense. Background Parenchymal Enhancement:  Mild. Right breast: There are no areas of suspicious enhancement. There is no    suspicious morphology. No dominant lesions are present. There is a 5 mm focus of enhancement within the right breast on axial    image 59 series 9. This demonstrates washout kinetics enhancement    characteristics. This correlates with a benign-appearing intramammary    lymph node within the outer right breast seen on    prior mammogram dated 8/27/2021. Left breast:        There is a focus of enhancement demonstrated apical left nipple seen on    axial image 39 series 9. This measures 9 x 4 mm. This demonstrates    heterogeneous enhancement kinetics including washout kinetics. Recommend    second look ultrasound of the left    periareolar/retroareolar region. There are no other areas of suspicious enhancement. There is no other    suspicious morphology. There are no abnormalities in the axilla, mediastinum or visualized bones. ultrasound of the left    periareolar/retroareolar region. A targeted ultrasound of the area of interest is recommended. The patient will be contacted by our department per protocol regarding additional imaging and scheduling. BI-RADS CATEGORY 0: Incomplete - Need additional imaging evaluation and/or prior mammograms for comparison. Management: Recall for additional imaging and/or comparison with prior exams. **This report has been created using voice recognition software. It may contain minor errors which are inherent in voice recognition technology. **       Final report electronically signed by Dr. Guru Sibley on 9/12/2022 12:35 PM

## 2022-09-26 NOTE — OP NOTE
[address] [address]  Operative Report    PATIENT NAME: Sheryle Blanc [facility] [place]  MEDICAL RECORD NO. [id number]  SURGEON: Jones West MD   Primary Care Physician: Rick Adorno MD  Date: 9/26/2022, 10:29 AM     PROCEDURE PERFORMED: Left subareolar excision excision left breast mass  PREOPERATIVE DIAGNOSIS:   Active Hospital Problems    Diagnosis Date Noted    Nipple discharge, bloody [N64.52] 09/26/2022     Priority: Medium      POSTOPERATIVE DIAGNOSIS: Same, path pending  SURGEON:  Jones West MD  ANESTHESIA:  Monitored Local Anesthesia with Sedation and local  ANESTHESIA:  20 OF 0.25% Marcaine and 1% xylocaine in equal parts  ESTIMATED BLOOD LOSS:  10  ml  SPECIMEN: Specimens sent to pathology  COMPLICATIONS:  None; patient tolerated the procedure well. DRAINS: none  DISPOSITION: Outpatient Surgery  CONDITION: stable      Narrative: Indications patient is a 53-year-old female who has had intermittent nipple discharge mostly seen on her bra every few months. She had diagnostic imaging which breast and ultrasound and really show much. MRI showed lesion at the apex of the nipple. She is opted for subareolar excision for definitive pathology. Procedure: Patient was brought to the operating suite left breast had not been marked it was confirmed as the correct side by the patient. She was placed supine on the operating table. She was given Ancef intravenously. Sedation was given per the anesthesia department. The left breast was prepped and draped in the usual sterile fashion. After timeout was performed the areolar border was infiltrated with local anesthetic skin incision was made lower outer quadrant. Subareolar excision was performed excising all the subareolar tissue to the back of the nipple itself. Additional retroareolar tissue from just below the skin of the nipple was taken and sent as a separate specimen. Wound was irrigated and hemostasis was achieved with electrocautery.   Dermis was closed with interrupted 3-0 Vicryl suture and skin was closed with running subcuticular 4-0 Monocryl suture. Skin glue was applied.

## 2022-09-26 NOTE — DISCHARGE INSTRUCTIONS
DR NICHOLSON'S DISCHARGE INSTRUCTIONS    Pt Name: Cesar Burns  Medical Record Number: [id number]  Today's Date: 9/26/2022    GENERAL ANESTHESIA OR SEDATION  1. Do not drive or operate hazardous machinery for 24 hours. 2. Do not make important business or personal decisions for 24 hours. 3. Do not drink alcoholic beverages or use tobacco for 24 hours. ACTIVITY INSTRUCTIONS:  [] Rest today. Resume light to normal activity tomorrow. [x] You may resume normal activity tomorrow. Do not engage in strenuous activity that may place stress on your incision. [] Do not drive for 3-5 days and avoid heavy lifting, tugging, pullings greater than 10-20 lbs until seen in the office. DIET INSTRUCTIONS:  []Begin with clear liquids. If not nauseated, may increase to a low-fat diet when you desire. Greasy and spicy foods are not advised. [x]Regular diet as tolerated. []Other:     MEDICATIONS  [x]Prescription sent with you to be used as directed. []Lortab   []Norco   [x]Percocet   []Tylenol #3   []Oxycontin   Do not drink alcohol or drive while taking these medications. You may experience dizziness or drowsiness with these medications. You may also experience constipation which can be relieved with stool softners or laxatives. [x]You may resume your daily prescription medication schedule unless otherwise specified. [x]Do not take 325mg Aspirin or other blood thinners such as Coumadin or Plavix for 5 days. WOUND/DRESSING INSTRUCTIONS:  Always ensure you and your care giver clean hands before and after caring for the wound. [] Keep dressing clean and dry for 48 hours. Change when soiled or wet. [] Allow steri-strips to fall off on their own.   [] Ice operative site for 20 minutes 4 times a day. [x] May wash over incision in shower in 24 hours, but do not soak in a bath for 1 week. [] Take sitz bath for 20 minutes twice daily and after bowel movements. [] Keep the abdominal binder in place during the day.  May remove to shower and at night.  [] Remove packing from wound in 24 hours and replace with AMD dressings daily. [] Empty JYOTI drain daily and record the amounts. BREAST PROCEDURES  [x]Following a breast procedure, it is important to continue to where supportive garments. []Following a sentinal lymph node biopsy, you should not be alarmed if your urine has a blue color to it. This is your body eliminating the dye used for the procedure. ABDOMINAL/LAPAROSCOPIC SURGERY  [x]You are encouraged to get up and move around as this helps with the circulation and speeds up the healing process. [x]Breath deeply and cough from time to time. This helps to clear your lungs and helps prevent pneumonia. []Supporting your incision with a pillow or your hand helps to minimize discomfort and pain. []Laparoscopic patients may develop shoulder pain in the first 48 hours from the gas used during the procedure. FOLLOW-UP CARE. SPECIFICALLY WATCH FOR:   Fever over 101 degrees by mouth   Increased redness, warmth, hardness at operative site. Blood soaked dressing (small amounts of oozing may be normal.)   Increased or progressive drainage from the surgical area   Inability to urinate or blood in the urine   Pain not relieved by the medications ordered   Persistent nausea and/or vomiting, unable to retain fluids. FOLLOW-UP APPOINTMENT   [x]1 week   [x]2 weeks   []Other    Call my office if you have any problem that concerns you [phone]. After hours, you can reach the answering service via the office phone number. IF YOU NEED IMMEDIATE ATTENTION, GO TO THE EMERGENCY ROOM AND YOUR DOCTOR WILL BE CONTACTED. Des Suarez MD MD  [address] [place] [phone] [facility], [address]  Electronically signed 9/26/2022 at 11:15 AM

## 2022-09-26 NOTE — PROGRESS NOTES
Pt returned to [facility] room 7. Vitals and assessment as charted. 0.9 infusing, @150ml to count from PACU. Pt has water. Family at the bedside. Pt and family verbalized understanding of discharge criteria and call light use. Call light in reach.

## 2022-09-26 NOTE — PROGRESS NOTES
Pharmacist Review and Automatic Dose Adjustment of Prophylactic Enoxaparin    *Review reason for admission/hospital problem list*    The reviewing pharmacist has made an adjustment to the ordered enoxaparin dose or converted to UFH per the approved [facility]  protocol and table as identified below. Calvin Bowling is a 38 y.o. female. No results for input(s): CREATININE in the last 72 hours. CrCl cannot be calculated (No successful lab value found. ). Recent Labs     09/23/22  1211   HGB 14.0   HCT 43.1     No results for input(s): INR in the last 72 hours. Height:   Ht Readings from Last 1 Encounters:   09/26/22 5' 9\" (1.753 m)     Weight:  Wt Readings from Last 1 Encounters:   09/26/22 241 lb 9.6 oz (109.6 kg)               Plan: Based upon the patient's weight and renal function, the ordered enoxaparin dose of lovenox 40mg daily has been changed/converted to lovenox 30 mg bid.       Thank you,  Lo Flores, [facility]  9/26/2022, 10:55 AM

## 2022-09-26 NOTE — ANESTHESIA PRE PROCEDURE
Department of Anesthesiology  Preprocedure Note       Name:  Meryle Berthold   Age:  38 y.o.  :  1980                                          MRN:  [id number]         Date:  2022      Surgeon: Woodrow Rosario):  Sandi Lackey MD    Procedure: Procedure(s):  LEFT BREAST BX, SUBAREOLAR EXCISION    Medications prior to admission:   Prior to Admission medications    Medication Sig Start Date End Date Taking? Authorizing Provider   Drospirenone (SLYND) 4 MG TABS  21   Historical Provider, MD       Current medications:    Current Facility-Administered Medications   Medication Dose Route Frequency Provider Last Rate Last Admin    0.9 % sodium chloride infusion   IntraVENous Continuous Sandi Lackey MD        sodium chloride flush 0.9 % injection 5-40 mL  5-40 mL IntraVENous 2 times per day Sandi Lackey MD        sodium chloride flush 0.9 % injection 5-40 mL  5-40 mL IntraVENous PRN Sandi Lackey MD        0.9 % sodium chloride infusion   IntraVENous PRN Sandi Lackey MD        ceFAZolin (ANCEF) 2000 mg in dextrose 5 % 50 mL IVPB  2,000 mg IntraVENous On Call to [address] [place] [address] [place], [place]           Allergies:  No Known Allergies    Problem List:  There is no problem list on file for this patient. Past Medical History:        Diagnosis Date    Lumbar herniated disc     Nipple discharge     left    PCOS (polycystic ovarian syndrome)        Past Surgical History:        Procedure Laterality Date    TONSILLECTOMY AND ADENOIDECTOMY      age 27       Social History:    Social History     Tobacco Use    Smoking status: Never    Smokeless tobacco: Never   Substance Use Topics    Alcohol use:  Yes                                Counseling given: Not Answered      Vital Signs (Current):   Vitals:    22 0857   BP: 120/75   Pulse: 68   Resp: 14   Temp: 97.4 °F (36.3 °C)   TempSrc: Temporal   SpO2: 98%   Weight: 241 lb 9.6 oz (109.6 kg)   Height: 5' 9\" (1.753 m)                                              BP Readings from Last 3 Encounters:   09/26/22 120/75   09/19/22 120/62   09/04/19 122/88       NPO Status: Time of last liquid consumption: 2230                        Time of last solid consumption: 2230                        Date of last liquid consumption: 09/25/22                        Date of last solid food consumption: 09/25/22    BMI:   Wt Readings from Last 3 Encounters:   09/26/22 241 lb 9.6 oz (109.6 kg)   09/19/22 241 lb 11.2 oz (109.6 kg)   09/09/22 230 lb (104.3 kg)     Body mass index is 35.68 kg/m². CBC:   Lab Results   Component Value Date/Time    HGB 14.0 09/23/2022 12:11 PM    HCT 43.1 09/23/2022 12:11 PM       CMP: No results found for: NA, K, CL, CO2, BUN, CREATININE, GFRAA, AGRATIO, LABGLOM, GLUCOSE, GLU, PROT, CALCIUM, BILITOT, ALKPHOS, AST, ALT    POC Tests: No results for input(s): POCGLU, POCNA, POCK, POCCL, POCBUN, POCHEMO, POCHCT in the last 72 hours. Coags: No results found for: PROTIME, INR, APTT    HCG (If Applicable):   Lab Results   Component Value Date    PREGTESTUR negative 09/26/2022        ABGs: No results found for: PHART, PO2ART, GQX2TIC, XZJ4DQV, BEART, Y7PARTLZ     Type & Screen (If Applicable):  No results found for: LABABO, LABRH    Drug/Infectious Status (If Applicable):  No results found for: HIV, HEPCAB    COVID-19 Screening (If Applicable): No results found for: COVID19        Anesthesia Evaluation   no history of anesthetic complications:   Airway: Mallampati: II  TM distance: >3 FB   Neck ROM: full  Mouth opening: > = 3 FB   Dental:          Pulmonary:normal exam              Patient did not smoke on day of surgery. Cardiovascular:  Exercise tolerance: good (>4 METS),                     Neuro/Psych:   Negative Neuro/Psych ROS              GI/Hepatic/Renal: Neg GI/Hepatic/Renal ROS            Endo/Other: Negative Endo/Other ROS             Pt had no PAT visit       Abdominal:   (+) obese,           Vascular: negative vascular ROS.          Other Findings:           Anesthesia Plan      MAC     ASA 2       Induction: intravenous. Anesthetic plan and risks discussed with patient. Plan discussed with CRNA.                     Yohana Noel MD   9/26/2022

## 2022-09-26 NOTE — PROGRESS NOTES
Admitted to Same Day Surgery and oriented to the unit. Patient verbalized approval for first name, last initial and physician name on the unit white board. Fall band on patient. Patients mother Lillian Dooley at bedside.

## 2022-09-27 ENCOUNTER — TELEPHONE (OUTPATIENT)
Dept: SURGERY | Age: 42
End: 2022-09-27

## 2022-09-27 NOTE — TELEPHONE ENCOUNTER
S/p left breast excision and subareloar excision done 9/26/2022 states she is doing well. Not taking pain medication due to having one episode of nausea and vomiting. So she stopped using it and switched to Tylenol. Feels better this am. Urinating fine. Incision looks fine. Dressing dry. No swelling. Eating and drinking fine. Instructed to call with any issues.

## 2022-09-28 ENCOUNTER — TELEPHONE (OUTPATIENT)
Dept: SURGERY | Age: 42
End: 2022-09-28

## 2022-10-10 ENCOUNTER — OFFICE VISIT (OUTPATIENT)
Dept: SURGERY | Age: 42
End: 2022-10-10

## 2022-10-10 VITALS
HEART RATE: 74 BPM | OXYGEN SATURATION: 98 % | BODY MASS INDEX: 35.7 KG/M2 | WEIGHT: 241 LBS | DIASTOLIC BLOOD PRESSURE: 60 MMHG | HEIGHT: 69 IN | RESPIRATION RATE: 18 BRPM | SYSTOLIC BLOOD PRESSURE: 120 MMHG | TEMPERATURE: 97.9 F

## 2022-10-10 DIAGNOSIS — Z09 POSTOP CHECK: ICD-10-CM

## 2022-10-10 DIAGNOSIS — D24.2 INTRADUCTAL PAPILLOMA OF BREAST, LEFT: Primary | ICD-10-CM

## 2022-10-10 PROCEDURE — 99024 POSTOP FOLLOW-UP VISIT: CPT | Performed by: SURGERY

## 2022-10-10 NOTE — PROGRESS NOTES
Meryle Blade, MD  General Surgery  Postprocedure Evaluation in Office  Pt Name: Hubert Harmon  Date of Birth 1980   Today's Date: 10/10/2022  Medical Record Number: [id number]  Primary Care Provider: Anny Murray MD  Chief Complaint   Patient presents with    Post-Op Check     S/p Left subareolar excision excision left breast mass 9/26/22     ASSESSMENT      1. Intraductal papilloma of breast, left    2. Postop check         PLANS       Pathology reviewed with the patient who understands. All questions were answered. Intraductal papilloma left breast.  Bloody nipple discharge has ceased. Follow up: Return in about 6 months (around 4/10/2023). For clinical breast examination. Recommend routine yearly breast imaging. 5.  Continue yearly follow-up and breast examinations with gynecology. Radha Mello is seen today for post-op follow-up. She is status post left subareolar excision and biopsy for intermittent bloody nipple discharge and MRI suggestive of retroareolar changes. Pathology benign intraductal papilloma. Surgical wound is healing well. Discussed pathology with patient all questions answered. Medications    Current Outpatient Medications:     Drospirenone (SLYND) 4 MG TABS, , Disp: , Rfl:   Allergies    No Known Allergies    Review of Systems  History obtained from the patient. Constitutional: Denies any fever, chills, fatigue. Wound: Denies any rash, skin color changes or wound problems. Resp: Denies any cough, shortness of breath. CV: Denies any chest pain, orthopnea or syncope. GI: Denies any nausea, vomiting, blood in the stool, constipation or diarrhea.      OBJECTIVE     VITALS: /60 (Site: Right Upper Arm, Position: Sitting, Cuff Size: Medium Adult)   Pulse 74   Temp 97.9 °F (36.6 °C) (Temporal)   Resp 18   Ht 5' 9\" (1.753 m)   Wt 241 lb (109.3 kg)   SpO2 98%   BMI 35.59 kg/m²     CONSTITUTIONAL: Alert and oriented times 3, no acute distress and cooperative to examination. SKIN: Skin color, texture, turgor normal. No rashes or lesions. INCISION: wound margins intact and healing well. No signs of infection. No drainage. NECK: Soft, trachea midline and straight  BREAST: Lumpectomy left No evidence of seroma or hematoma. LUNGS: clear  CARDIOVASCULAR: normal rate  NEUROLOGIC: No sensory or motor nerve irritation  EXTREMITIES: no cyanosis, no clubbing and no edema. Narrative  Performed by: [address]. Pathology      EllisonJasvir yates Elsie                   [id number]   [facility] [employer] , [address]                                                       PROC: 2022   [facility]                                    RECV: 2022   [address]                                    RPTD: 2022   Alie Bains, [address]                       MRN:  [id number]    LOC: OR                       ACCT: [de-identified]  SEX: F                       : 1980  AGE: 38 Y                          PATHOLOGY REPORT                       ATTN: AMBER NICHOLSON       Copies To:   Alyssa Eid       Clinical Information: NIPPLE DISCHARGE, BLOODY [N64.52]     FINAL DIAGNOSIS:   A. Left breast, retro-subareolar, excision:     Intraductal papilloma with usual ductal hyperplasia. B. Left breast, subareolar, excision:     Fibrocystic changes including stromal fibrosis, usual ductal   hyperplasia, apocrine  metaplasia and cysts. Specimen:   A) BREAST, MAMMARY, LEFT RETRO SUBAREOLAR EXCISION   B) BREAST, MAMMARY, LEFT SUBAREOLAR TISSUE       Gross Examination:   A - The container is labeled Sweta Dale, retro-subareolar excision,   left breast.  Received fresh are two bits of yellow-tan tissue   measuring 0.5 and 1 cm in greatest dimension. The specimen is entirely   submitted. 1 ns.      Fixative:  10% neutral buffered formalin   Tissue removal time:  10:07   Tissue fixation time:  11:17   Total fixation time: 32 hours 43 minutes     B - The container is labeled Arthur Garcia, left breast subareolar   tissue. Received fresh is an oriented fragment of yellow-white   fibroadipose tissue. There is no localization wire. The specimen   measures 5 cm from the cranial to caudal margin, 4 cm from the medial   to lateral margin, and 1.5 cm from the skin to deep margin. The skin   margin is inked blue, the deep margin is inked yellow, the lateral and   caudal margins are inked black, and the medial and cranial margins are   inked green. Sections through the specimen reveal a yellow-white   fibrofatty cut surface. There are no discrete lesions grossly   identified. Representative sections are submitted. Cassette #1 -   caudal margin; cassette #2 - medial, lateral, skin, and deep margins;   cassette #3 - lateral, skin, and deep; cassette #4 - medial, skin, and   deep; cassette #5 - lateral, skin, and deep; cassette #6 - medial,   skin, and deep; and cassette #7 - cranial margin.  ss.   PCF/DKR:[id number]     Fixative:  10% neutral buffered formalin   Tissue removal time:  10:09   Tissue fixation time:  11:17   Total fixation time: 32 hours 43 minutes     Microscopic Examination:   A. Sections show subareolar breast with an intraductal papilloma with   usual ductal hyperplasia. The lesion is present at the tissue edge. B. Sections show fibroglandular breast tissue with fibrocystic changes   including stromal fibrosis, usual ductal hyperplasia, apocrine   metaplasia and cysts. There is no evidence of residual papilloma. Epithelial atypia and malignancy are not seen. [id number] x 2                                                       <Sign Out Dr. Sonia Estrada M.D., F.C. A. P       [facility]  Printed on:  9/28/2022   [address]   [facility] Scott[facility]aleELIJAH HOPE II.VIERTEL, [employer]   Original print date: 09/28/2022      Specimen Collected: 09/26/22 11:35 EDT Last Resulted: 09/28/22 09:12 EDT       Order Details     View Encounter     Lab and Collection Details     Routing     Result History

## 2022-12-07 ENCOUNTER — HOSPITAL ENCOUNTER (OUTPATIENT)
Dept: MRI IMAGING | Age: 42
Discharge: HOME | End: 2022-12-07
Payer: COMMERCIAL

## 2022-12-07 DIAGNOSIS — M48.07 LUMBOSACRAL STENOSIS: ICD-10-CM

## 2022-12-07 PROCEDURE — 72148 MRI LUMBAR SPINE W/O DYE: CPT

## 2024-02-23 ENCOUNTER — HOSPITAL ENCOUNTER (OUTPATIENT)
Dept: MRI IMAGING | Age: 44
Discharge: HOME | End: 2024-02-23
Payer: COMMERCIAL

## 2024-02-23 ENCOUNTER — HOSPITAL ENCOUNTER (OUTPATIENT)
Dept: WOMENS IMAGING | Age: 44
Discharge: HOME | End: 2024-02-23
Attending: RADIOLOGY

## 2024-02-23 DIAGNOSIS — Z00.6 ENCOUNTER FOR EXAMINATION FOR NORMAL COMPARISON AND CONTROL IN CLINICAL RESEARCH PROGRAM: ICD-10-CM

## 2024-02-23 DIAGNOSIS — Z80.3 FAMILY HISTORY OF MALIGNANT NEOPLASM OF BREAST: ICD-10-CM

## 2024-02-23 PROCEDURE — C8908 MRI W/O FOL W/CONT, BREAST,: HCPCS

## 2024-02-23 PROCEDURE — 6360000004 HC RX CONTRAST MEDICATION: Performed by: NURSE PRACTITIONER

## 2024-02-23 PROCEDURE — A9579 GAD-BASE MR CONTRAST NOS,1ML: HCPCS | Performed by: NURSE PRACTITIONER

## 2024-02-23 RX ADMIN — GADOTERIDOL 20 ML: 279.3 INJECTION, SOLUTION INTRAVENOUS at 08:26

## (undated) DEVICE — GLOVE SURG SZ 7 L12IN FNGR THK94MIL TRNSLUC YEL LTX HYDRGEL

## (undated) DEVICE — BREAST HERNIA PACK: Brand: MEDLINE INDUSTRIES, INC.

## (undated) DEVICE — SUTURE VCRL + SZ 3-0 L27IN ABSRB UD L26MM SH 1/2 CIR VCP416H

## (undated) DEVICE — PENCIL SMK EVAC ALL IN 1 DSGN ENH VISIBILITY IMPROVED AIR

## (undated) DEVICE — BANDAGE ADH W1XL3IN NAT FAB WVN FLX DURABLE N ADH PD SEAL

## (undated) DEVICE — SPECIMEN ORIENTATION CHARMS, SIX DISTINCTLY SHAPED STERILE 10MM CHARMS: Brand: MARGINMAP

## (undated) DEVICE — ADHESIVE SKIN CLSR 0.7ML TOP DERMBND ADV

## (undated) DEVICE — SUTURE MCRYL SZ 4-0 L27IN ABSRB UD L19MM PS-2 1/2 CIR PRIM Y426H